# Patient Record
Sex: MALE | Race: WHITE | NOT HISPANIC OR LATINO
[De-identification: names, ages, dates, MRNs, and addresses within clinical notes are randomized per-mention and may not be internally consistent; named-entity substitution may affect disease eponyms.]

---

## 2022-03-15 PROBLEM — Z00.00 ENCOUNTER FOR PREVENTIVE HEALTH EXAMINATION: Status: ACTIVE | Noted: 2022-03-15

## 2022-03-21 ENCOUNTER — APPOINTMENT (OUTPATIENT)
Dept: OTOLARYNGOLOGY | Facility: CLINIC | Age: 67
End: 2022-03-21
Payer: COMMERCIAL

## 2022-03-21 VITALS — HEIGHT: 67 IN | BODY MASS INDEX: 30.61 KG/M2 | WEIGHT: 195 LBS

## 2022-03-21 DIAGNOSIS — H60.93 UNSPECIFIED OTITIS EXTERNA, BILATERAL: ICD-10-CM

## 2022-03-21 PROCEDURE — 99203 OFFICE O/P NEW LOW 30 MIN: CPT | Mod: 25

## 2022-03-21 NOTE — HISTORY OF PRESENT ILLNESS
[de-identified] : Patient presents  today  c/o   frequent ear  infection .   Has been  get  a  right ear  infection  ,  at least  every 3-4  months. Denies any issues  with hearing .   Ear infections   started  within the 5  years .  No recent  audiogram .  Denies any tinnitus or   dizziness.

## 2022-09-19 ENCOUNTER — APPOINTMENT (OUTPATIENT)
Dept: OTOLARYNGOLOGY | Facility: CLINIC | Age: 67
End: 2022-09-19

## 2022-10-25 ENCOUNTER — APPOINTMENT (OUTPATIENT)
Dept: ORTHOPEDIC SURGERY | Facility: CLINIC | Age: 67
End: 2022-10-25

## 2022-10-25 VITALS — WEIGHT: 200 LBS | BODY MASS INDEX: 31.39 KG/M2 | HEIGHT: 67 IN

## 2022-10-25 DIAGNOSIS — M17.12 UNILATERAL PRIMARY OSTEOARTHRITIS, LEFT KNEE: ICD-10-CM

## 2022-10-25 DIAGNOSIS — M25.562 PAIN IN LEFT KNEE: ICD-10-CM

## 2022-10-25 PROCEDURE — 99203 OFFICE O/P NEW LOW 30 MIN: CPT | Mod: 25

## 2022-10-25 PROCEDURE — 73562 X-RAY EXAM OF KNEE 3: CPT | Mod: LT

## 2022-10-25 PROCEDURE — 20610 DRAIN/INJ JOINT/BURSA W/O US: CPT | Mod: LT

## 2022-10-25 RX ORDER — MELOXICAM 15 MG/1
15 TABLET ORAL
Qty: 30 | Refills: 1 | Status: ACTIVE | COMMUNITY
Start: 2022-10-25 | End: 1900-01-01

## 2022-11-03 NOTE — IMAGING
[de-identified] :   Examination of the left knee is as follows:  Minimal effusion noted.  No erythema or ecchymosis.  Able to perform active straight leg raise.  Knee flexion from 0-120 degrees.  Medial joint line and patellofemoral tenderness to palpation.  Calf is soft and nontender.  Negative Cesar's.  Light touch intact throughout.  Nonantalgic gait.\par \par X-rays taken of the patient's left knee in the office today revealed no obvious fractures, subluxations, or dislocations.  Joint space narrowing along with patellofemoral/osteoarthritic changes noted.

## 2022-11-03 NOTE — DISCUSSION/SUMMARY
[de-identified] :   Mobic 15 mg PO QD PRN was sent to patient's pharmacy to help alleviate their symptoms.  Patient was advised to monitor his blood pressure while taking this medication.  The patient was advised to rest/ice the area and can alternate with warm compresses as needed.  \par \par With the patient's approval, and under sterile technique, I performed a steroid injection today.  See the attached procedure note for further details.  The patient was informed that their next cortisone injection could not be until a minimum of three months from today's date and the patient understands.  Explained to the patient that the full effect of the injection will take 3-5 days to kick in.\par \par The knee conditioning program from the [facility] was printed and given to the patient so he may try that at home. Patient will follow-up in 3 months for further evaluation. At that point, if the patient is still in pain, will consider further imaging studies. All of the patient's questions/concerns were answered in detail.  \par \par The patient was seen under the supervision of Dr. Barrios.\par

## 2022-11-03 NOTE — HISTORY OF PRESENT ILLNESS
[de-identified] :  Patient is a 67-year-old male who reports the office for evaluation of his left knee pain that has been aggravating him since 10/10/2022.  He was playing pickle ball on a cruise when he stepped awkwardly which caused pain in his left knee.  Since then, walking, up and down stairs, getting up from seated position, flexing and extending the knee all aggravate the patient's pain at times.  Denies any buckling, locking, or instability.  Denies any numbness or tingling.

## 2022-11-03 NOTE — PROCEDURE
[Large Joint Injection] : Large joint injection [Left] : of the left [Knee] : knee [Pain] : pain [Alcohol] : alcohol [____] : [unfilled] [] : Patient tolerated procedure well [Call if redness, pain or fever occur] : call if redness, pain or fever occur [Apply ice for 15min out of every hour for the next 12-24 hours as tolerated] : apply ice for 15 minutes out of every hour for the next 12-24 hours as tolerated

## 2022-11-15 ENCOUNTER — OUTPATIENT (OUTPATIENT)
Dept: OUTPATIENT SERVICES | Facility: HOSPITAL | Age: 67
LOS: 1 days | Discharge: HOME | End: 2022-11-15

## 2022-11-15 DIAGNOSIS — M25.562 PAIN IN LEFT KNEE: ICD-10-CM

## 2022-11-15 DIAGNOSIS — M25.561 PAIN IN RIGHT KNEE: ICD-10-CM

## 2022-11-15 PROCEDURE — 73562 X-RAY EXAM OF KNEE 3: CPT | Mod: 26

## 2022-12-20 ENCOUNTER — RX RENEWAL (OUTPATIENT)
Age: 67
End: 2022-12-20

## 2022-12-29 ENCOUNTER — RX RENEWAL (OUTPATIENT)
Age: 67
End: 2022-12-29

## 2023-01-26 ENCOUNTER — APPOINTMENT (OUTPATIENT)
Dept: ORTHOPEDIC SURGERY | Facility: CLINIC | Age: 68
End: 2023-01-26

## 2023-02-23 ENCOUNTER — APPOINTMENT (OUTPATIENT)
Dept: ORTHOPEDIC SURGERY | Facility: CLINIC | Age: 68
End: 2023-02-23

## 2024-08-07 ENCOUNTER — APPOINTMENT (OUTPATIENT)
Dept: ORTHOPEDIC SURGERY | Facility: CLINIC | Age: 69
End: 2024-08-07

## 2024-08-07 ENCOUNTER — TRANSCRIPTION ENCOUNTER (OUTPATIENT)
Age: 69
End: 2024-08-07

## 2024-08-07 PROBLEM — M72.2 PLANTAR FASCIITIS, LEFT: Status: ACTIVE | Noted: 2024-08-07

## 2024-08-07 PROCEDURE — 73630 X-RAY EXAM OF FOOT: CPT | Mod: LT

## 2024-08-07 PROCEDURE — 99204 OFFICE O/P NEW MOD 45 MIN: CPT

## 2024-08-07 NOTE — DISCUSSION/SUMMARY
[de-identified] : I discussed the patient's clinical radiographic findings with him.  Given the length of the patient's symptoms as well as suspicion for neuroma I would like to obtain an MRI without contrast.  This will let us determine best next Epson treatment with this be injection or surgical management.  All questions sought and answered.

## 2024-08-07 NOTE — DATA REVIEWED
[FreeTextEntry1] : 3 views of the patient's left foot ordered reviewed by me personally.  The x-rays not demonstrate any fracture or dislocation.

## 2024-08-07 NOTE — PHYSICAL EXAM
[de-identified] : He is alert oriented x 3.  Is pleasant throughout the exam P examination of his left lower extremity was undertaken.  The patient is tender at the plantar fascia lateral band.  He has full range of motion of the ankle hindfoot midfoot forefoot.  He is tender at the third webspace.  He has pain with squeezing the metatarsal heads together.  Compartments are soft compressible.  He is neurovascular intact distally.

## 2024-08-07 NOTE — HISTORY OF PRESENT ILLNESS
[de-identified] : 69-year-old patient here for eval of his left foot.  The patient states that for the past couple months he has had pain over the plantar fascia as well as his foot.  The pain is different in the sense that it hurts him the more he walks on it.  He does not really describe any start of pain.  It really began after he was playing pickle ball 2 months ago.  No injury.  So far terms of treatment he is utilizing a home exercise program and activity modification.

## 2024-09-23 ENCOUNTER — APPOINTMENT (OUTPATIENT)
Dept: ORTHOPEDIC SURGERY | Facility: CLINIC | Age: 69
End: 2024-09-23
Payer: COMMERCIAL

## 2024-09-23 DIAGNOSIS — M72.2 PLANTAR FASCIAL FIBROMATOSIS: ICD-10-CM

## 2024-09-23 PROCEDURE — 99214 OFFICE O/P EST MOD 30 MIN: CPT | Mod: 25

## 2024-09-23 PROCEDURE — 20550 NJX 1 TENDON SHEATH/LIGAMENT: CPT | Mod: LT

## 2024-09-23 NOTE — HISTORY OF PRESENT ILLNESS
[de-identified] : Patient here for follow-up of his left foot.  He continues to have pain over the plantar fascia origin.  He had an MRI done.  He notes that the pain has gotten better somewhat but is still describing pain especially with weightbearing.

## 2024-09-23 NOTE — DISCUSSION/SUMMARY
[de-identified] : I discussed the patient's clinical radiographic findings with him and different treatment options.  He would like to proceed forward with an injection.  Under sterile conditions I injected 1 cc of lidocaine and 1 cc of dexamethasone into the point of maximal tenderness at the origin of the plantar fascia.  Patient tolerated procedure well.  I will see him back in as-needed basis.  All questions sought and answered.

## 2024-09-23 NOTE — PHYSICAL EXAM
[de-identified] : He is tender over the midportion of the plantar fascia at the origin.  He has pain with dorsiflexion and activation of the plantar fascia mechanism.  He is neurovascular intact distally.